# Patient Record
Sex: MALE | Race: AMERICAN INDIAN OR ALASKA NATIVE
[De-identification: names, ages, dates, MRNs, and addresses within clinical notes are randomized per-mention and may not be internally consistent; named-entity substitution may affect disease eponyms.]

---

## 2020-11-30 ENCOUNTER — HOSPITAL ENCOUNTER (INPATIENT)
Dept: HOSPITAL 5 - ED | Age: 55
LOS: 2 days | Discharge: HOME | DRG: 280 | End: 2020-12-02
Attending: INTERNAL MEDICINE | Admitting: INTERNAL MEDICINE
Payer: MEDICARE

## 2020-11-30 DIAGNOSIS — I13.2: Primary | ICD-10-CM

## 2020-11-30 DIAGNOSIS — E11.22: ICD-10-CM

## 2020-11-30 DIAGNOSIS — I50.31: ICD-10-CM

## 2020-11-30 DIAGNOSIS — I21.A1: ICD-10-CM

## 2020-11-30 DIAGNOSIS — Z79.891: ICD-10-CM

## 2020-11-30 DIAGNOSIS — N18.6: ICD-10-CM

## 2020-11-30 DIAGNOSIS — Z79.01: ICD-10-CM

## 2020-11-30 DIAGNOSIS — D63.8: ICD-10-CM

## 2020-11-30 DIAGNOSIS — Z99.2: ICD-10-CM

## 2020-11-30 DIAGNOSIS — Z79.84: ICD-10-CM

## 2020-11-30 DIAGNOSIS — E78.5: ICD-10-CM

## 2020-11-30 DIAGNOSIS — Z88.8: ICD-10-CM

## 2020-11-30 DIAGNOSIS — D69.59: ICD-10-CM

## 2020-11-30 DIAGNOSIS — I20.9: ICD-10-CM
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
## 2020-11-30 DIAGNOSIS — T45.525A: ICD-10-CM

## 2020-11-30 DIAGNOSIS — Z79.899: ICD-10-CM

## 2020-11-30 DIAGNOSIS — E87.5: ICD-10-CM

## 2020-11-30 DIAGNOSIS — Z79.82: ICD-10-CM

## 2020-11-30 LAB
ALBUMIN SERPL-MCNC: 4.6 G/DL (ref 3.9–5)
ALT SERPL-CCNC: 11 UNITS/L (ref 7–56)
BASOPHILS # (AUTO): 0.1 K/MM3 (ref 0–0.1)
BASOPHILS NFR BLD AUTO: 0.8 % (ref 0–1.8)
BILIRUB DIRECT SERPL-MCNC: < 0.2 MG/DL (ref 0–0.2)
BUN SERPL-MCNC: 54 MG/DL (ref 9–20)
BUN/CREAT SERPL: 4 %
CALCIUM SERPL-MCNC: 9.5 MG/DL (ref 8.4–10.2)
EOSINOPHIL # BLD AUTO: 0.1 K/MM3 (ref 0–0.4)
EOSINOPHIL NFR BLD AUTO: 0.8 % (ref 0–4.3)
HCT VFR BLD CALC: 44.5 % (ref 35.5–45.6)
HDLC SERPL-MCNC: 65 MG/DL (ref 40–59)
HEMOLYSIS INDEX: 7
HGB BLD-MCNC: 14.5 GM/DL (ref 11.8–15.2)
LYMPHOCYTES # BLD AUTO: 1.2 K/MM3 (ref 1.2–5.4)
LYMPHOCYTES NFR BLD AUTO: 11.9 % (ref 13.4–35)
MCHC RBC AUTO-ENTMCNC: 33 % (ref 32–34)
MCV RBC AUTO: 91 FL (ref 84–94)
MONOCYTES # (AUTO): 1 K/MM3 (ref 0–0.8)
MONOCYTES % (AUTO): 10.6 % (ref 0–7.3)
PLATELET # BLD: 116 K/MM3 (ref 140–440)
RBC # BLD AUTO: 4.9 M/MM3 (ref 3.65–5.03)

## 2020-11-30 PROCEDURE — 82962 GLUCOSE BLOOD TEST: CPT

## 2020-11-30 PROCEDURE — 80076 HEPATIC FUNCTION PANEL: CPT

## 2020-11-30 PROCEDURE — 93306 TTE W/DOPPLER COMPLETE: CPT

## 2020-11-30 PROCEDURE — A9540 TC99M MAA: HCPCS

## 2020-11-30 PROCEDURE — 93005 ELECTROCARDIOGRAM TRACING: CPT

## 2020-11-30 PROCEDURE — 80074 ACUTE HEPATITIS PANEL: CPT

## 2020-11-30 PROCEDURE — 36415 COLL VENOUS BLD VENIPUNCTURE: CPT

## 2020-11-30 PROCEDURE — 85025 COMPLETE CBC W/AUTO DIFF WBC: CPT

## 2020-11-30 PROCEDURE — 83735 ASSAY OF MAGNESIUM: CPT

## 2020-11-30 PROCEDURE — 80048 BASIC METABOLIC PNL TOTAL CA: CPT

## 2020-11-30 PROCEDURE — 93017 CV STRESS TEST TRACING ONLY: CPT

## 2020-11-30 PROCEDURE — 78452 HT MUSCLE IMAGE SPECT MULT: CPT

## 2020-11-30 PROCEDURE — 84484 ASSAY OF TROPONIN QUANT: CPT

## 2020-11-30 PROCEDURE — 84100 ASSAY OF PHOSPHORUS: CPT

## 2020-11-30 PROCEDURE — 80061 LIPID PANEL: CPT

## 2020-11-30 PROCEDURE — A9502 TC99M TETROFOSMIN: HCPCS

## 2020-11-30 PROCEDURE — 78580 LUNG PERFUSION IMAGING: CPT

## 2020-11-30 PROCEDURE — 71046 X-RAY EXAM CHEST 2 VIEWS: CPT

## 2020-11-30 RX ADMIN — Medication SCH ML: at 22:33

## 2020-11-30 RX ADMIN — HEPARIN SODIUM SCH UNIT: 5000 INJECTION, SOLUTION INTRAVENOUS; SUBCUTANEOUS at 22:33

## 2020-11-30 NOTE — EVENT NOTE
ED Screening Note


ED Screening Note: 








left sided cp and rib pain that began saturday after finishing dialysis 


states he has bouts of SOB when he gets pain 


no leg swelling 


has a permcath for dialysis 


no fever 


no cough 


no v/d


no sick contacts


no recent travel 


PMHx ESRD, DM, HTN, HLD, anemia 


allergy: lisinopril 





This initial assessment/diagnostic orders/clinical plan/treatment(s) is/are 

subject to change based on patients health status, clinical progression and re-

assessment by fellow clinical providers in the ED. Further treatment and workup 

at subsequent clinical providers discretion. Patient/guardian urged not to elope

from the ED as their condition may be serious if not clinically assessed and 

managed. 





Initial orders include: 


CP protocol

## 2020-11-30 NOTE — HISTORY AND PHYSICAL REPORT
History of Present Illness


Chief complaint: 





My chest hurts and it is getting worse


History of present illness: 


54 YO Male with ESRD on HD(T,R,Sa), HTN, DM, Anemia of Chronic Disease presents 

to ED for evaluation.  Patient states that he has experienced chest pain over 

the past 2 days with acutely worsening symptoms over the past 6 hours.  Patient 

states that symptoms were initially intermittent but have now become constant.  

Patient states that pain is 9/10, constant, substernal, nonradiating, sharp, 

squeezing in nature.  Patient denies decreased exercise tolerance, dyspnea on 

exertion, dyspnea at rest.  Patient transported to [facility] via private vehicle for 

further care and evaluation of the aforementioned symptoms.  Patient seen and 

evaluated in the emergency department.  All lab and imaging studies reviewed.  

Patient found to have angina as well as non-ST elevation MI, end-stage renal 

disease in need of dialysis, as well as clinical symptoms consistent with 

diastolic congestive heart failure.  Patient admitted to telemetry due to 

increased risk of cardiac decompensation.  Cardiology team consulted in ED.  

Nephrology team consulted in ED for urgent dialysis.  Patient denies fever, 

chills, palpitations, productive cough, skin rash, recent ill contacts, or known

exposure to COVID-19.  No prior admission for review.  No medication listed for 

reconciliation at the time of my admission.








Past History


Past Medical History: diabetes, ESRD, hypertension


Past Surgical History: Other (Dialysis access)





Medications and Allergies


                                    Allergies











Allergy/AdvReac Type Severity Reaction Status Date / Time


 


lisinopril AdvReac  Unknown Verified 11/30/20 09:03














Review of Systems


Constitutional: no weight loss, no weight gain, no fever, no chills


Ears, nose, mouth and throat: no ear pain, no ear discharge, no tinnitis, no 

decreased hearing, no nose pain, no nasal discharge


Cardiovascular: chest pain, shortness of breath, dyspnea on exertion, decreased 

exercise tolerance, no palpitations, no rapid/irregular heart beat


Respiratory: no cough, no excessive sputum, no hemoptysis, no shortness of 

breath


Gastrointestinal: no abdominal pain, no nausea, no vomiting, no diarrhea, no 

constipation


Genitourinary Male: no hematuria, no flank pain, no discharge, no urinary 

frequency, no urinary hesitancy


Rectal: no pain, no incontinence, no bleeding


Musculoskeletal: no neck stiffness, no neck pain, no shooting arm pain, no arm 

numbness/tingling, no low back pain, no leg numbness/tingling


Integumentary: no rash, no pruritis, no redness, no sores, no wounds


Neurological: no transient paralysis, no paralysis, no weakness, no parathesias,

no numbness, no tingling, no seizures


Psychiatric: no anxiety, no memory loss, no change in sleep habits, no sleep 

disturbances, no insomnia, no hypersomnia, no change in appetite, no change in 

libido


Endocrine: no cold intolerance, no heat intolerance, no polyphagia, no excessive

thirst, no nocturia


Hematologic/Lymphatic: no easy bruising, no easy bleeding, no lymphedema


Allergic/Immunologic: no urticaria, no allergic rhinitis, no wheezing, no 

anaphylaxis, no angioedema





Exam





- Constitutional


Vitals: 


                                        











Temp Pulse Resp BP Pulse Ox


 


 98.6 F   89   17   149/85   97 


 


 11/30/20 09:03  11/30/20 14:14  11/30/20 14:14  11/30/20 14:14  11/30/20 14:14











General appearance: Present: mild distress, cachectic





- EENT


Eyes: Present: PERRL


ENT: hearing intact, clear oral mucosa





- Neck


Neck: Present: supple, normal ROM





- Respiratory


Respiratory effort: normal


Respiratory: bilateral: CTA





- Cardiovascular


Heart Sounds: Present: S1 & S2.  Absent: rub, click





- Extremities


Extremities: pulses symmetrical, No edema


Peripheral Pulses: within normal limits





- Abdominal


General gastrointestinal: Present: soft, non-tender, non-distended, normal bowel

sounds


Male genitourinary: Present: normal





- Integumentary


Integumentary: Present: clear, warm, dry





- Musculoskeletal


Musculoskeletal: gait normal, strength equal bilaterally





- Psychiatric


Psychiatric: appropriate mood/affect, intact judgment & insight





- Neurologic


Neurologic: CNII-XII intact, moves all extremities





HEART Score





- HEART Score


Troponin: 


                                        











Troponin T  0.120 ng/mL (0.00-0.029)  H*  11/30/20  14:29    














Results





- Labs


CBC & Chem 7: 


                                 11/30/20 10:39





                                 11/30/20 10:39


Labs: 


                              Abnormal lab results











  11/30/20 11/30/20 11/30/20 Range/Units





  10:39 10:39 10:39 


 


Plt Count  116 L    (140-440)  K/mm3


 


Lymph % (Auto)  11.9 L    (13.4-35.0)  %


 


Mono % (Auto)  10.6 H    (0.0-7.3)  %


 


Mono # (Auto)  1.0 H    (0.0-0.8)  K/mm3


 


Seg Neutrophils %  75.9 H    (40.0-70.0)  %


 


Potassium   5.5 H   (3.6-5.0)  mmol/L


 


Chloride   96.6 L   ()  mmol/L


 


BUN   54 H   (9-20)  mg/dL


 


Creatinine   13.2 H   (0.8-1.3)  mg/dL


 


Phosphorus    5.30 H  (2.5-4.5)  mg/dL


 


Magnesium    2.50 H  (1.7-2.3)  mg/dL


 


Troponin T   0.119 H*   (0.00-0.029)  ng/mL


 


HDL Cholesterol   65 H   (40-59)  mg/dL














  11/30/20 Range/Units





  14:29 


 


Plt Count   (140-440)  K/mm3


 


Lymph % (Auto)   (13.4-35.0)  %


 


Mono % (Auto)   (0.0-7.3)  %


 


Mono # (Auto)   (0.0-0.8)  K/mm3


 


Seg Neutrophils %   (40.0-70.0)  %


 


Potassium   (3.6-5.0)  mmol/L


 


Chloride   ()  mmol/L


 


BUN   (9-20)  mg/dL


 


Creatinine   (0.8-1.3)  mg/dL


 


Phosphorus   (2.5-4.5)  mg/dL


 


Magnesium   (1.7-2.3)  mg/dL


 


Troponin T  0.120 H*  (0.00-0.029)  ng/mL


 


HDL Cholesterol   (40-59)  mg/dL














Assessment and Plan





- Patient Problems


(1) Angina at rest


Current Visit: Yes   Status: Acute   


Plan to address problem: 


Serial cardiac enzymes, EKG, telemetry, cardiology team consulted in ED, 

morphine, supplemental oxygen, nitro, aspirin,








(2) Non-ST elevation MI (NSTEMI)


Current Visit: Yes   Status: Acute   


Plan to address problem: 


Serial cardiac enzymes, EKG, telemetry monitoring, cardiology team consulted in 

ED, echocardiogram ordered and is pending at time of admission, stress test 

pending as per cardiology team.








(3) Diastolic CHF


Current Visit: Yes   Status: Acute   


Qualifiers: 


   Heart failure chronicity: acute   Qualified Code(s): I50.31 - Acute diastolic

(congestive) heart failure   


Plan to address problem: 


Strict I's/O, monitor urine output every shift, daily weight, afterload 

reduction, blood pressure control, echocardiogram ordered and is pending at time

of admission.








(4) DVT prophylaxis


Current Visit: Yes   Status: Acute   


Plan to address problem: 


SCD to bilateral lower extremities while in bed, prophylactic anticoagulation.

## 2020-11-30 NOTE — EMERGENCY DEPARTMENT REPORT
ED General Adult HPI





- General


Chief complaint: Chest Pain


Stated complaint: LF SIDE RIB PAIN


Time Seen by Provider: 11/30/20 11:54


Source: patient


Mode of arrival: Wheelchair


Limitations: No Limitations





- History of Present Illness


Initial comments: 





The patient presents to the emergency department the chief complaint of left-

sided chest pain that has been intermittent since Saturday.  Patient states the 

chest pain started after dialysis.  Patient states that he saw his cardiologist 

who is with [facility] cardiology and is scheduled for stress test next 

Monday.  Patient denies shortness of breath, abdominal pain, headache.


-: Sudden


Location: chest


Radiation: non-radiation


Severity scale (0 -10): 9


Quality: sharp


Consistency: constant


Improves with: none


Worsens with: none


Associated Symptoms: denies other symptoms


Treatments Prior to Arrival: none





- Related Data


                                    Allergies











Allergy/AdvReac Type Severity Reaction Status Date / Time


 


lisinopril AdvReac  Unknown Verified 11/30/20 09:03














ED Review of Systems


ROS: 


Stated complaint: LF SIDE RIB PAIN


Other details as noted in HPI





Comment: All other systems reviewed and negative


Constitutional: denies: chills, fever


Eyes: denies: eye pain, eye discharge, vision change


ENT: denies: ear pain, throat pain


Respiratory: denies: cough, shortness of breath, wheezing


Cardiovascular: chest pain.  denies: palpitations


Endocrine: no symptoms reported


Gastrointestinal: denies: abdominal pain, nausea, diarrhea


Genitourinary: denies: urgency, dysuria


Musculoskeletal: denies: back pain, joint swelling, arthralgia


Skin: denies: rash, lesions


Neurological: denies: headache, weakness, paresthesias


Psychiatric: denies: anxiety, depression


Hematological/Lymphatic: denies: easy bleeding, easy bruising





ED Past Medical Hx





- Past Medical History


Previous Medical History?: Yes


Hx Hypertension: Yes


Hx Diabetes: Yes


Hx Renal Disease: Yes (Dialysis Tue, Thur, Saturday)


Additional medical history: Anemia





- Social History


Smoking Status: Never Smoker


Substance Use Type: None





ED Physical Exam





- General


Limitations: No Limitations


General appearance: alert, in no apparent distress





- Head


Head exam: Present: atraumatic, normocephalic





- Eye


Eye exam: Present: normal appearance, PERRL





- ENT


ENT exam: Present: mucous membranes moist





- Neck


Neck exam: Present: normal inspection





- Respiratory


Respiratory exam: Present: normal lung sounds bilaterally.  Absent: respiratory 

distress





- Cardiovascular


Cardiovascular Exam: Present: regular rate, normal rhythm.  Absent: systolic 

murmur, diastolic murmur, rubs, gallop





- GI/Abdominal


GI/Abdominal exam: Present: soft, normal bowel sounds.  Absent: distended, 

tenderness





- Rectal


Rectal exam: Present: deferred





- Extremities Exam


Extremities exam: Present: normal inspection





- Back Exam


Back exam: Present: normal inspection





- Neurological Exam


Neurological exam: Present: alert, oriented X3, CN II-XII intact.  Absent: motor

sensory deficit





- Psychiatric


Psychiatric exam: Present: normal affect, normal mood





- Skin


Skin exam: Present: warm, dry, intact, normal color.  Absent: rash





ED Course


                                   Vital Signs











  11/30/20 11/30/20





  09:03 14:14


 


Temperature 98.6 F 


 


Pulse Rate 92 H 89


 


Respiratory 18 17





Rate  


 


Blood Pressure 134/77 149/85





[Right]  


 


O2 Sat by Pulse 96 97





Oximetry  














ED Medical Decision Making





- Lab Data


Result diagrams: 


                                 11/30/20 10:39





                                 11/30/20 10:39








                                   Lab Results











  11/30/20 11/30/20 11/30/20 Range/Units





  10:39 10:39 10:39 


 


WBC  9.6    (4.5-11.0)  K/mm3


 


RBC  4.90    (3.65-5.03)  M/mm3


 


Hgb  14.5    (11.8-15.2)  gm/dl


 


Hct  44.5    (35.5-45.6)  %


 


MCV  91    (84-94)  fl


 


MCH  30    (28-32)  pg


 


MCHC  33    (32-34)  %


 


RDW  14.4    (13.2-15.2)  %


 


Plt Count  116 L    (140-440)  K/mm3


 


Lymph % (Auto)  11.9 L    (13.4-35.0)  %


 


Mono % (Auto)  10.6 H    (0.0-7.3)  %


 


Eos % (Auto)  0.8    (0.0-4.3)  %


 


Baso % (Auto)  0.8    (0.0-1.8)  %


 


Lymph # (Auto)  1.2    (1.2-5.4)  K/mm3


 


Mono # (Auto)  1.0 H    (0.0-0.8)  K/mm3


 


Eos # (Auto)  0.1    (0.0-0.4)  K/mm3


 


Baso # (Auto)  0.1    (0.0-0.1)  K/mm3


 


Seg Neutrophils %  75.9 H    (40.0-70.0)  %


 


Seg Neutrophils #  7.3    (1.8-7.7)  K/mm3


 


Sodium   139   (137-145)  mmol/L


 


Potassium   5.5 H   (3.6-5.0)  mmol/L


 


Chloride   96.6 L   ()  mmol/L


 


Carbon Dioxide   25   (22-30)  mmol/L


 


Anion Gap   23   mmol/L


 


BUN   54 H   (9-20)  mg/dL


 


Creatinine   13.2 H   (0.8-1.3)  mg/dL


 


Estimated GFR   4   ml/min


 


BUN/Creatinine Ratio   4   %


 


Glucose   78   ()  mg/dL


 


Calcium   9.5   (8.4-10.2)  mg/dL


 


Phosphorus    5.30 H  (2.5-4.5)  mg/dL


 


Magnesium    2.50 H  (1.7-2.3)  mg/dL


 


Total Bilirubin    0.60  (0.1-1.2)  mg/dL


 


Direct Bilirubin    < 0.2  (0-0.2)  mg/dL


 


AST    11  (5-40)  units/L


 


ALT    11  (7-56)  units/L


 


Alkaline Phosphatase    83  ()  units/L


 


Troponin T   0.119 H*   (0.00-0.029)  ng/mL


 


Total Protein    7.8  (6.3-8.2)  g/dL


 


Albumin    4.6  (3.9-5)  g/dL


 


Albumin/Globulin Ratio    1.4  %


 


Triglycerides   68   (2-149)  mg/dL


 


Cholesterol   176   ()  mg/dL


 


LDL Cholesterol Direct   103   ()  mg/dL


 


HDL Cholesterol   65 H   (40-59)  mg/dL


 


Cholesterol/HDL Ratio   2.70   %














  11/30/20 Range/Units





  14:29 


 


WBC   (4.5-11.0)  K/mm3


 


RBC   (3.65-5.03)  M/mm3


 


Hgb   (11.8-15.2)  gm/dl


 


Hct   (35.5-45.6)  %


 


MCV   (84-94)  fl


 


MCH   (28-32)  pg


 


MCHC   (32-34)  %


 


RDW   (13.2-15.2)  %


 


Plt Count   (140-440)  K/mm3


 


Lymph % (Auto)   (13.4-35.0)  %


 


Mono % (Auto)   (0.0-7.3)  %


 


Eos % (Auto)   (0.0-4.3)  %


 


Baso % (Auto)   (0.0-1.8)  %


 


Lymph # (Auto)   (1.2-5.4)  K/mm3


 


Mono # (Auto)   (0.0-0.8)  K/mm3


 


Eos # (Auto)   (0.0-0.4)  K/mm3


 


Baso # (Auto)   (0.0-0.1)  K/mm3


 


Seg Neutrophils %   (40.0-70.0)  %


 


Seg Neutrophils #   (1.8-7.7)  K/mm3


 


Sodium   (137-145)  mmol/L


 


Potassium   (3.6-5.0)  mmol/L


 


Chloride   ()  mmol/L


 


Carbon Dioxide   (22-30)  mmol/L


 


Anion Gap   mmol/L


 


BUN   (9-20)  mg/dL


 


Creatinine   (0.8-1.3)  mg/dL


 


Estimated GFR   ml/min


 


BUN/Creatinine Ratio   %


 


Glucose   ()  mg/dL


 


Calcium   (8.4-10.2)  mg/dL


 


Phosphorus   (2.5-4.5)  mg/dL


 


Magnesium   (1.7-2.3)  mg/dL


 


Total Bilirubin   (0.1-1.2)  mg/dL


 


Direct Bilirubin   (0-0.2)  mg/dL


 


AST   (5-40)  units/L


 


ALT   (7-56)  units/L


 


Alkaline Phosphatase   ()  units/L


 


Troponin T  0.120 H*  (0.00-0.029)  ng/mL


 


Total Protein   (6.3-8.2)  g/dL


 


Albumin   (3.9-5)  g/dL


 


Albumin/Globulin Ratio   %


 


Triglycerides   (2-149)  mg/dL


 


Cholesterol   ()  mg/dL


 


LDL Cholesterol Direct   ()  mg/dL


 


HDL Cholesterol   (40-59)  mg/dL


 


Cholesterol/HDL Ratio   %














- EKG Data


-: EKG Interpreted by Me


EKG shows normal: sinus rhythm


Rate: normal





- Radiology Data


Radiology results: report reviewed


Critical care attestation.: 


If time is entered above; I have spent that time in minutes in the direct care 

of this critically ill patient, excluding procedure time.








ED Disposition


Clinical Impression: 


 Chest pain





Disposition: DC-09 OP ADMIT IP TO THIS HOSP


Is pt being admited?: Yes


Does the pt Need Aspirin: Yes


Condition: Fair


Instructions:  Chest Pain (ED)


Referrals: 


TESFAYE CASTILLO MD [Primary Care Provider] - 3-5 Days

## 2020-11-30 NOTE — NUCLEAR MEDICINE REPORT
NUCLEAR MEDICINE PERFUSION LUNG SCAN



INDICATION / CLINICAL INFORMATION:

SOB.



TECHNIQUE:

5.2 mCi of Tc-99m MAA were given by IV.



COMPARISON:

Chest radiographs dated 11/30/2020.



FINDINGS:



PERFUSION: No significant perfusion defects.



ADDITIONAL FINDINGS: None.



IMPRESSION:

1. Low probability for pulmonary embolism.



Signer Name: Rajan Soni MD 

Signed: 11/30/2020 1:54 PM

Workstation Name: [email or web address]

## 2020-11-30 NOTE — XRAY REPORT
CHEST 2 VIEWS 



INDICATION / CLINICAL INFORMATION:

Chest Pain.



COMPARISON: 

None.



FINDINGS:



SUPPORT DEVICES: Bilateral central venous catheters present with both tips projecting over the superi
or cavoatrial junction.



HEART / MEDIASTINUM: No significant abnormality. 



LUNGS / PLEURA: Mild pulmonary vascular congestion. No pneumothorax. 



ADDITIONAL FINDINGS: No significant additional findings.



IMPRESSION:

1. Mild pulmonary vascular congestion without focal pulmonary abnormality.



Signer Name: Sotero Lawrence MD 

Signed: 11/30/2020 10:29 AM

Workstation Name: [id number]

## 2020-12-01 LAB
BASOPHILS # (AUTO): 0 K/MM3 (ref 0–0.1)
BASOPHILS NFR BLD AUTO: 0.4 % (ref 0–1.8)
BUN SERPL-MCNC: 67 MG/DL (ref 9–20)
BUN/CREAT SERPL: 5 %
CALCIUM SERPL-MCNC: 9.1 MG/DL (ref 8.4–10.2)
EOSINOPHIL # BLD AUTO: 0.2 K/MM3 (ref 0–0.4)
EOSINOPHIL NFR BLD AUTO: 2.5 % (ref 0–4.3)
HCT VFR BLD CALC: 41.9 % (ref 35.5–45.6)
HEMOLYSIS INDEX: 10
HGB BLD-MCNC: 13.7 GM/DL (ref 11.8–15.2)
LYMPHOCYTES # BLD AUTO: 1.4 K/MM3 (ref 1.2–5.4)
LYMPHOCYTES NFR BLD AUTO: 17.2 % (ref 13.4–35)
MCHC RBC AUTO-ENTMCNC: 33 % (ref 32–34)
MCV RBC AUTO: 91 FL (ref 84–94)
MONOCYTES # (AUTO): 0.9 K/MM3 (ref 0–0.8)
MONOCYTES % (AUTO): 12 % (ref 0–7.3)
PLATELET # BLD: 120 K/MM3 (ref 140–440)
RBC # BLD AUTO: 4.6 M/MM3 (ref 3.65–5.03)

## 2020-12-01 PROCEDURE — 5A1D70Z PERFORMANCE OF URINARY FILTRATION, INTERMITTENT, LESS THAN 6 HOURS PER DAY: ICD-10-PCS | Performed by: INTERNAL MEDICINE

## 2020-12-01 RX ADMIN — Medication SCH ML: at 09:44

## 2020-12-01 RX ADMIN — Medication SCH ML: at 22:52

## 2020-12-01 RX ADMIN — HEPARIN SODIUM SCH UNIT: 5000 INJECTION, SOLUTION INTRAVENOUS; SUBCUTANEOUS at 09:45

## 2020-12-01 RX ADMIN — HEPARIN SODIUM SCH UNIT: 5000 INJECTION, SOLUTION INTRAVENOUS; SUBCUTANEOUS at 22:52

## 2020-12-01 RX ADMIN — METOPROLOL TARTRATE SCH MG: 25 TABLET, FILM COATED ORAL at 22:52

## 2020-12-01 NOTE — CONSULTATION
History of Present Illness





- Reason for Consult


Consult date: 12/01/20


end stage renal disease


Requesting physician: HAYDEN NEAL





- History of Present Illness


54 YO Male with ESRD on HD(T,R,Sa), HTN, DM, Anemia of Chronic Disease presents 

to ED for evaluation.  Patient states that he has experienced chest pain over 

the past 2 days with acutely worsening symptoms over the past 6 hours.  Patient 

states that symptoms were initially intermittent but have now become constant.  

Patient states that pain is 9/10, constant, substernal, nonradiating, sharp, 

squeezing in nature.  Patient denies decreased exercise tolerance, dyspnea on 

exertion, dyspnea at rest.  Patient transported to [facility] via private vehicle for 

further care and evaluation of the aforementioned symptoms.  Patient seen and 

evaluated in the emergency department.  All lab and imaging studies reviewed.  

Patient found to have angina as well as non-ST elevation MI, end-stage renal 

disease in need of dialysis, as well as clinical symptoms consistent with 

diastolic congestive heart failure.  Patient admitted to telemetry due to 

increased risk of cardiac decompensation.  Cardiology team consulted in ED. 


Patient undergoes dialysis at [facility] on TTS schedule under our

care.  Patient did have his dialysis on Saturday and is due for dialysis again 

today.  He denies any shortness of breath at this time.








Past History


Past Medical History: diabetes, dialysis, ESRD, hypertension


Past Surgical History: Other (Dialysis access)





Medications and Allergies


                                    Allergies











Allergy/AdvReac Type Severity Reaction Status Date / Time


 


lisinopril AdvReac  Unknown Verified 11/30/20 09:03











                                Home Medications











 Medication  Instructions  Recorded  Confirmed  Last Taken  Type


 


Aspirin BABY CHEW  mg PO ONCE 12/01/20 12/01/20 Unknown History


 


AtorvaSTATin 40 mg PO ONCE 12/01/20 12/01/20 Unknown History


 


NovoLOG Mix 70/30 VIAL 3 units IJ ONCE 12/01/20 12/01/20 Unknown History


 


Velphoro (Nf) 500 mg PO TID 12/01/20 12/01/20 Unknown History











Active Meds: 


Active Medications





Acetaminophen (Tylenol)  650 mg PO Q4H PRN


   PRN Reason: Pain MILD(1-3)/Fever >100.5/HA


Albuterol (Proventil)  2.5 mg IH Q4HRT PRN


   PRN Reason: Shortness Of Breath


Aspirin (Aspirin)  325 mg PO QDAY ROSHAN


Atorvastatin Calcium (Lipitor)  40 mg PO QHS ROSHAN


Heparin Sodium (Porcine) (Heparin)  5,000 unit SUB-Q Q12HR [facility]


   Last Admin: 12/01/20 09:45 Dose:  5,000 unit


   Documented by: 


Sodium Chloride (Nacl 0.9%)  100 mls @ 999 mls/hr IV MIGUEL PRN


   PRN Reason: Hypotension


Metoprolol Tartrate (Metoprolol)  25 mg PO BID [facility]


Ondansetron HCl (Zofran)  4 mg IV Q8H PRN


   PRN Reason: Nausea And Vomiting


Sodium Chloride (Sodium Chloride Flush Syringe 10 Ml)  10 ml IV BID [facility]


   Last Admin: 12/01/20 09:44 Dose:  10 ml


   Documented by: 


Sodium Chloride (Sodium Chloride Flush Syringe 10 Ml)  10 ml IV PRN PRN


   PRN Reason: LINE FLUSH











Review of Systems


All systems: negative (Negative except as noted above)





Exam





- Vital Signs


Vital signs: 


                                   Vital Signs











Temp Pulse Resp BP Pulse Ox


 


 98.6 F   92 H  18   134/77   96 


 


 11/30/20 09:03  11/30/20 09:03  11/30/20 09:03  11/30/20 09:03  11/30/20 09:03














- General Appearance


General appearance: well-developed, well-nourished, appears stated age


EENT: PERRL, mucous membranes moist


Neck: Present: neck supple, trachea midline, Other (Right IJ PermCath in place).

 Absent: JVD/HJR, Masses


Respiratory: Clear to Ascultation


Heart: regular, normal heart rate, S1S2, no murmurs


Gastrointestinal: Present: normal, normoactive bowel sounds


Integumentary: no rash, other (No edema.  Multiple old AV access noted in his 

left upper arm.  No bruit or thrill)





Results





- Lab Results





                                 12/01/20 07:23





                                 12/01/20 07:23


                             Most recent lab results











Calcium  9.1 mg/dL (8.4-10.2)   12/01/20  07:23    


 


Phosphorus  5.30 mg/dL (2.5-4.5)  H  11/30/20  10:39    


 


Magnesium  2.50 mg/dL (1.7-2.3)  H  11/30/20  10:39    














Assessment and Plan


Impression


* End-stage renal disease on maintenance hemodialysis


* Hyperkalemia


* Chest pain


* Hypertension


* Diabetes


Recommendations


* Shall schedule patient for hemodialysis for today and keep him on TTS schedule

  as outpatient


* [facility]


* Hyperkalemia should be corrected with dialysis


* No indication for Epogen at this time


* Adjust diet and meds for ESRD state


* Avoid nephrotoxins


* Binders with meals


* Further plan as per cardiology services


* Thank you very much for the consultation.  Shall follow along with you

## 2020-12-01 NOTE — CONSULTATION
History of Present Illness


Consult date: 12/01/20


Requesting physician: NATHANAEL MORGAN


Consult reason: chest pain


History of present illness: 


The pt is a 56 YO male with a past medical history of ESRD on HD, HTN, DM. He is

previously unknown to our practice. He presented with c/o chest pain since 

Saturday. He describes his chest pain as an intermittent, left-sided ribcage 

aching pain which is aggravated by deep inspiration and positional changes 

(hurts to lie on his left side). He denies SOB, palpitations, n/v, diaphoresis, 

dizziness or syncope. He denies any known prior cardiac issues. He reports 

undergoing treadmill stress testing in the past which was normal to his 

knowledge. 








Past History


Past Medical History: diabetes, dialysis, ESRD, hypertension


Past Surgical History: Other (Dialysis access)





Medications and Allergies


                                    Allergies











Allergy/AdvReac Type Severity Reaction Status Date / Time


 


lisinopril AdvReac  Unknown Verified 11/30/20 09:03











                                Home Medications











 Medication  Instructions  Recorded  Confirmed  Last Taken  Type


 


Aspirin BABY CHEW  mg PO ONCE 12/01/20 12/01/20 Unknown History


 


AtorvaSTATin 40 mg PO ONCE 12/01/20 12/01/20 Unknown History


 


NovoLOG Mix 70/30 VIAL 3 units IJ ONCE 12/01/20 12/01/20 Unknown History


 


Velphoro (Nf) 500 mg PO TID 12/01/20 12/01/20 Unknown History











Active Meds: 


Active Medications





Acetaminophen (Tylenol)  650 mg PO Q4H PRN


   PRN Reason: Pain MILD(1-3)/Fever >100.5/HA


Albuterol (Proventil)  2.5 mg IH Q4HRT PRN


   PRN Reason: Shortness Of Breath


Heparin Sodium (Porcine) (Heparin)  5,000 unit SUB-Q Q12HR [facility]


   Last Admin: 12/01/20 09:45 Dose:  5,000 unit


   Documented by: 


Ondansetron HCl (Zofran)  4 mg IV Q8H PRN


   PRN Reason: Nausea And Vomiting


Sodium Chloride (Sodium Chloride Flush Syringe 10 Ml)  10 ml IV BID [facility]


   Last Admin: 12/01/20 09:44 Dose:  10 ml


   Documented by: 


Sodium Chloride (Sodium Chloride Flush Syringe 10 Ml)  10 ml IV PRN PRN


   PRN Reason: LINE FLUSH











Review of Systems


Constitutional: no weight loss, no weight gain, no fever, no chills, no sweats


Ears, nose, mouth and throat: no ear pain, no nose pain, no sinus pressure, no 

sinus pain


Cardiovascular: chest pain, no orthopnea, no palpitations, no rapid/irregular 

heart beat, no edema, no syncope, no lightheadedness, no shortness of breath, no

dyspnea on exertion, no leg edema


Respiratory: pain on inspiration, no cough, no shortness of breath, no dyspnea 

on exertion, no congestion, no wheezing


Gastrointestinal: no abdominal pain, no nausea, no vomiting, no diarrhea, no 

constipation, no change in bowel habits


Genitourinary Male: no dysuria, no hematuria, no flank pain, no discharge, no u

rinary frequency, no urinary hesitancy


Musculoskeletal: no neck stiffness, no neck pain, no shooting arm pain, no arm 

numbness/tingling, no low back pain, no shooting leg pain


Integumentary: no rash, no pruritis, no redness, no sores, no wounds


Neurological: no head injury, no paralysis, no weakness, no parathesias, no numb

ness, no tingling, no seizures, no syncope


Psychiatric: no anxiety


Endocrine: no cold intolerance, no heat intolerance


Hematologic/Lymphatic: no easy bruising, no easy bleeding


Allergic/Immunologic: no urticaria





Physical Examination


                                   Vital Signs











Temp Pulse Resp BP Pulse Ox


 


 98.6 F   92 H  18   134/77   96 


 


 11/30/20 09:03  11/30/20 09:03  11/30/20 09:03  11/30/20 09:03  11/30/20 09:03











General appearance: no acute distress


HEENT: Positive: PERRL, Normocephaly, Mucus Membranes Moist


Neck: Positive: neck supple, trachea midline


Cardiac: Positive: Reg Rate and Rhythm, S1/S2


Lungs: Positive: Decreased Breath Sounds


Abdomen: Negative: Tender


Skin: Negative: Rash


Musculoskeletal: No Pain


Extremities: Absent: edema





Results





                                 12/01/20 07:23





                                 11/30/20 10:39


                                 Cardiac Enzymes











  11/30/20 Range/Units





  10:39 


 


AST  11  (5-40)  units/L








                                     Lipids











  11/30/20 Range/Units





  10:39 


 


Triglycerides  68  (2-149)  mg/dL


 


Cholesterol  176  ()  mg/dL


 


HDL Cholesterol  65 H  (40-59)  mg/dL


 


Cholesterol/HDL Ratio  2.70  %








                                       CBC











  11/30/20 12/01/20 Range/Units





  10:39 07:23 


 


WBC  9.6  7.9  (4.5-11.0)  K/mm3


 


RBC  4.90  4.60  (3.65-5.03)  M/mm3


 


Hgb  14.5  13.7  (11.8-15.2)  gm/dl


 


Hct  44.5  41.9  (35.5-45.6)  %


 


Plt Count  116 L  120 L  (140-440)  K/mm3


 


Lymph # (Auto)  1.2  1.4  (1.2-5.4)  K/mm3


 


Mono # (Auto)  1.0 H  0.9 H  (0.0-0.8)  K/mm3


 


Eos # (Auto)  0.1  0.2  (0.0-0.4)  K/mm3


 


Baso # (Auto)  0.1  0.0  (0.0-0.1)  K/mm3








                          Comprehensive Metabolic Panel











  11/30/20 11/30/20 Range/Units





  10:39 10:39 


 


Sodium  139   (137-145)  mmol/L


 


Potassium  5.5 H   (3.6-5.0)  mmol/L


 


Chloride  96.6 L   ()  mmol/L


 


Carbon Dioxide  25   (22-30)  mmol/L


 


BUN  54 H   (9-20)  mg/dL


 


Creatinine  13.2 H   (0.8-1.3)  mg/dL


 


Glucose  78   ()  mg/dL


 


Calcium  9.5   (8.4-10.2)  mg/dL


 


Direct Bilirubin   < 0.2  (0-0.2)  mg/dL


 


AST   11  (5-40)  units/L


 


ALT   11  (7-56)  units/L


 


Alkaline Phosphatase   83  ()  units/L


 


Total Protein   7.8  (6.3-8.2)  g/dL


 


Albumin   4.6  (3.9-5)  g/dL














- Imaging and Cardiology


Echo: pending


EKG: report reviewed, image reviewed





EKG interpretations





- Telemetry


EKG Rhythm: Sinus Rhythm





- EKG


Sinus rhythms and dysrhythmias: sinus rhythm





Assessment and Plan


Chest pain appears musculoskeletal in origin, currently resolved. EKG with NSR, 

no acute ischemic changes. V/Q low prob for PE. Cont present cardiac regimen. 

Obtain echo and plan for lexiscan MPI stress test in AM. NPO after MN. 





The patient has been seen in conjunction with Dr. Elam who agrees with the 

assessment and plan of care. 








- Patient Problems


(1) Chest pain


Current Visit: Yes   Status: Acute   





(2) Non-ST elevation MI (NSTEMI)


Current Visit: Yes   Status: Acute   


Plan to address problem: 


suspect type II








(3) HTN (hypertension)


Current Visit: Yes   Status: Chronic   





(4) Diabetes


Current Visit: Yes   Status: Chronic   





(5) ESRD on hemodialysis


Current Visit: Yes   Status: Chronic

## 2020-12-01 NOTE — PROGRESS NOTE
Assessment and Plan





-- Angina at rest


Monitor weight serial cardiac enzymes, EKG, telemetry, 


cardiology team consulted in ED, 


Placed on morphine, supplemental oxygen, nitro, aspirin,


VQ scan negative for any PE


Plan for stress test tomorrow








-- Non-ST elevation MI (NSTEMI)


Likely type II due to end-stage renal disease


Cardiology consulted, continue to monitor clinically for now


Stress test ordered for tomorrow





--Hyperkalemia, due to end-stage renal disease


Should correct with hemodialysis, repeat BMP in the morning





-- HTN.


Resume home antihypertensive, continue to adjust medications as needed








-- diabetes mellitus type 2


Consistent carb diet, SSI for blood glucose management





ESRD


-Nephrology consulted for hemodialysis





Brief history:


The pt is a 54 YO male with a past medical history of ESRD on HD, HTN, DM 

presented with c/o left-sided chest pain since Saturday. He describes his chest 

pain as an intermittent, left-sided ribcage aching pain which is aggravated by 

deep inspiration and positional changes (hurts to lie on his left side).  Checks

x-ray showed mild pulmonary vascular congestion, VQ scan showed low probability 

for PE.  Cardiac enzymes showed elevated troponin, patient placed on aspirin and

statin, cardiology consulted in the ER, admitted for further evaluation and 

management.





12/01: Plan for stress test tomorrow, nephrology consulted for hemodialysis.  

Continue to monitor clinically








Subjective


Date of service: 12/01/20


Interval history: 





Patient seen and examined.  Medical records and medication list reviewed.  


No acute event overnight noted by the RN.  


Patient denies any chest pain now, has no difficulty breathing.  


Patient is tolerating diet.  


Discussed plan of care at bedside with patient.


Stress test canceled for today as patient had a VQ scan -plan for tomorrow 

morning





Objective





- Exam


Narrative Exam: 





GENERAL:  well-developed and well-nourished -American male lying on bed 

appeared to be in no discomfort. 


HEENT: Normocephalic.  Atraumatic.  No conjunctival congestion or icterus. 

Patient has moist mucous membranes.


NECK: Supple.  Trachea midline.


CHEST/LUNGS: Clear to auscultated bilaterally, breathing nonlabored. No wheezes 

crackles or rhonchi.


HEART/CARDIOVASCULAR: Regular in rate and rhythm.  S1 and S2 positive.


ABDOMEN: Abdomen is soft, nontender.  Patient has normal bowel sounds.  


SKIN: There is no rash.  Warm and dry.


NEURO:  No focal motor deficit.  Follows command.


MUSCULOSKELETAL: No joint effusion or tenderness.


EXTRIMITY: No edema, no cyanosis or clubbing.


PSYCH:  Cooperative.





- Constitutional


Vitals: 


                               Vital Signs - 12hr











  12/01/20 12/01/20 12/01/20





  00:46 03:42 07:35


 


Temperature  98.0 F 97.8 F


 


Pulse Rate 79 77 74


 


Pulse Rate [   





Left Radial]   


 


Pulse Rate [   





Right Radial]   


 


Respiratory  18 18





Rate   


 


Respiratory   





Rate [lLUQ]   


 


Blood Pressure  126/77 142/84


 


O2 Sat by Pulse  94 96





Oximetry   














  12/01/20 12/01/20





  10:00 11:57


 


Temperature  97.8 F


 


Pulse Rate  79


 


Pulse Rate [ 74 





Left Radial]  


 


Pulse Rate [ 74 





Right Radial]  


 


Respiratory 19 18





Rate  


 


Respiratory 18 





Rate [lLUQ]  


 


Blood Pressure  134/81


 


O2 Sat by Pulse  96





Oximetry  














- Labs


CBC & Chem 7: 


                                 12/01/20 07:23





                                 12/02/20 09:38


Labs: 


                              Abnormal lab results











  11/30/20 11/30/20 11/30/20 Range/Units





  10:39 14:29 Unknown 


 


Plt Count     (140-440)  K/mm3


 


Mono % (Auto)     (0.0-7.3)  %


 


Mono # (Auto)     (0.0-0.8)  K/mm3


 


Sodium     (137-145)  mmol/L


 


Potassium     (3.6-5.0)  mmol/L


 


Chloride     ()  mmol/L


 


BUN     (9-20)  mg/dL


 


Creatinine     (0.8-1.3)  mg/dL


 


POC Glucose     ()  mg/dL


 


Phosphorus  5.30 H    (2.5-4.5)  mg/dL


 


Magnesium  2.50 H    (1.7-2.3)  mg/dL


 


Troponin T   0.120 H*  0.112 H*  (0.00-0.029)  ng/mL














  12/01/20 12/01/20 12/01/20 Range/Units





  07:23 07:23 11:24 


 


Plt Count  120 L    (140-440)  K/mm3


 


Mono % (Auto)  12.0 H    (0.0-7.3)  %


 


Mono # (Auto)  0.9 H    (0.0-0.8)  K/mm3


 


Sodium   135 L   (137-145)  mmol/L


 


Potassium   5.5 H   (3.6-5.0)  mmol/L


 


Chloride   96.1 L   ()  mmol/L


 


BUN   67 H   (9-20)  mg/dL


 


Creatinine   14.5 H   (0.8-1.3)  mg/dL


 


POC Glucose    152 H  ()  mg/dL


 


Phosphorus     (2.5-4.5)  mg/dL


 


Magnesium     (1.7-2.3)  mg/dL


 


Troponin T     (0.00-0.029)  ng/mL














HEART Score





- HEART Score


Troponin: 


                                        











Troponin T  0.112 ng/mL (0.00-0.029)  H*  11/30/20  Unknown

## 2020-12-02 VITALS — SYSTOLIC BLOOD PRESSURE: 89 MMHG | DIASTOLIC BLOOD PRESSURE: 59 MMHG

## 2020-12-02 LAB
BUN SERPL-MCNC: 35 MG/DL (ref 9–20)
BUN/CREAT SERPL: 4 %
CALCIUM SERPL-MCNC: 9.2 MG/DL (ref 8.4–10.2)
HEMOLYSIS INDEX: 16

## 2020-12-02 RX ADMIN — INSULIN HUMAN SCH: 100 INJECTION, SOLUTION PARENTERAL at 08:30

## 2020-12-02 RX ADMIN — Medication SCH: at 13:00

## 2020-12-02 RX ADMIN — INSULIN HUMAN SCH UNIT: 100 INJECTION, SOLUTION PARENTERAL at 13:29

## 2020-12-02 RX ADMIN — INSULIN HUMAN SCH: 100 INJECTION, SOLUTION PARENTERAL at 00:55

## 2020-12-02 RX ADMIN — METOPROLOL TARTRATE SCH: 25 TABLET, FILM COATED ORAL at 13:00

## 2020-12-02 RX ADMIN — HEPARIN SODIUM SCH UNIT: 5000 INJECTION, SOLUTION INTRAVENOUS; SUBCUTANEOUS at 10:53

## 2020-12-02 NOTE — PROGRESS NOTE
Assessment and Plan


tte reviewed - EF 50-55%, catheter in RA. S/p lexiscan MPI stress test today 

which showed small mild inferolateral ischemia, normal EF. Given ECG with NAF, 

relatively flat Warner, atypical nature of chest pain and current resolution of 

chest pain, recommend medical management at this time. Cont , lipitor, 

lopressor. Initiate Imdur. No Plavix due to reported history of thrombocytopenia

secondary to Plavix. Can consider coronary angiography as OP if chest pain 

recurs despite optimal medical management.


Currently stable cardiac status. Pt may discharge from cardiology standpoint. 


Follow up in our [place] office with Dr. Elam on 12/14/2020 @ 11:15AM. 





The patient has been seen in conjunction with Dr. Elam who agrees with the 

assessment and plan of care. 








- Patient Problems


(1) Chest pain


Current Visit: Yes   Status: Resolved   





(2) Non-ST elevation MI (NSTEMI)


Current Visit: Yes   Status: Acute   


Plan to address problem: 


suspect type II








(3) HTN (hypertension)


Current Visit: Yes   Status: Chronic   





(4) Diabetes


Current Visit: Yes   Status: Chronic   





(5) ESRD on hemodialysis


Current Visit: Yes   Status: Chronic   





Subjective


Date of service: 12/02/20


Principal diagnosis: cp


Interval history: 


pt for stress test, no current complaints. tele reviewed - in SR.








Objective





                                Last Vital Signs











Temp  98.3 F   12/02/20 07:33


 


Pulse  79   12/02/20 07:33


 


Resp  20   12/02/20 07:33


 


BP  121/71   12/02/20 08:53


 


Pulse Ox  92   12/02/20 07:33

















- Physical Examination


General: No Apparent Distress


HEENT: Positive: PERRL, Normocephaly, Mucus Membranes Moist


Neck: Positive: neck supple, trachea midline, Other (Right IJ PermCath in 

place).  Negative: JVD/HJR, Masses


Cardiac: Positive: Reg Rate and Rhythm, S1/S2


Lungs: Positive: Decreased Breath Sounds


Abdomen: Negative: Tender


Skin: Negative: Rash


Musculoskeletal: No Pain


Extremities: Absent: edema





- Labs and Meds


                          Comprehensive Metabolic Panel











  12/02/20 Range/Units





  09:38 


 


Sodium  136 L  (137-145)  mmol/L


 


Potassium  4.5  (3.6-5.0)  mmol/L


 


Chloride  95.9 L  ()  mmol/L


 


Carbon Dioxide  29  (22-30)  mmol/L


 


BUN  35 H  (9-20)  mg/dL


 


Creatinine  10.0 H  (0.8-1.3)  mg/dL


 


Glucose  151 H  ()  mg/dL


 


Calcium  9.2  (8.4-10.2)  mg/dL














- Imaging and Cardiology


EKG: report reviewed, image reviewed


Echo: pending





- EKG


Sinus rhythms and dysrhythmias: sinus rhythm

## 2020-12-02 NOTE — PROGRESS NOTE
Assessment and Plan


Impression


* End-stage renal disease on maintenance hemodialysis


* Hyperkalemia


* Chest pain


* Hypertension


* Diabetes


Recommendations


* Patient had uneventful hemodialysis yesterday 


* Continue dialysis on TTS schedule.  


* Patient undergoes dialysis at [facility] on TTS schedule


* Hyperkalemia should be corrected with dialysis


* No indication for Epogen at this time


* Adjust diet and meds for ESRD state


* Avoid nephrotoxins


* Binders with meals


* Further plan as per cardiology services











Subjective


Date of service: 12/02/20


Interval history: 


Patient is currently undergoing a stress test.  Patient denies any chest pain or

shortness of breath.








Objective





- Vital Signs


Vital signs: 


                               Vital Signs - 12hr











  12/01/20 12/01/20 12/01/20





  21:45 22:00 22:49


 


Temperature  98.0 F 97.7 F


 


Pulse Rate 82 80 83


 


Respiratory  18 16





Rate   


 


Blood Pressure 143/90 144/88 139/92


 


O2 Sat by Pulse   96





Oximetry   














  12/01/20 12/02/20 12/02/20





  22:52 03:23 07:33


 


Temperature  97.9 F 98.3 F


 


Pulse Rate 83 69 79


 


Respiratory  16 20





Rate   


 


Blood Pressure 139/92 127/71 105/74


 


O2 Sat by Pulse  95 92





Oximetry   














  12/02/20 12/02/20 12/02/20





  08:26 08:27 08:49


 


Temperature   


 


Pulse Rate   


 


Respiratory   





Rate   


 


Blood Pressure 120/78 122/75 109/68


 


O2 Sat by Pulse   





Oximetry   














  12/02/20 12/02/20 12/02/20





  08:50 08:52 08:53


 


Temperature   


 


Pulse Rate   


 


Respiratory   





Rate   


 


Blood Pressure 123/67 126/70 121/71


 


O2 Sat by Pulse   





Oximetry   














- General Appearance


General appearance: well-developed, well-nourished, appears stated age


EENT: PERRL, mucous membranes moist


Neck: no JVD, no thyromegaly, no carotid bruit, supple, other (Right IJ PermCath

in place)


Respiratory: Present: Clear to Ascultation


Cardiology: regular, normal heart rate, S1S2, no murmurs


Gastrointestinal: normal, normoactive bowel sounds


Integumentary: no rash, other (No edema)





- Lab





                                 12/01/20 07:23





                                 12/01/20 07:23


                             Most recent lab results











Calcium  9.1 mg/dL (8.4-10.2)   12/01/20  07:23    


 


Phosphorus  5.30 mg/dL (2.5-4.5)  H  11/30/20  10:39    


 


Magnesium  2.50 mg/dL (1.7-2.3)  H  11/30/20  10:39    














Medications & Allergies





- Medications


Allergies/Adverse Reactions: 


                                    Allergies





lisinopril Adverse Reaction (Verified 11/30/20 09:03)


   Unknown








Home Medications: 


                                Home Medications











 Medication  Instructions  Recorded  Confirmed  Last Taken  Type


 


Aspirin BABY CHEW  mg PO ONCE 12/01/20 12/01/20 Unknown History


 


AtorvaSTATin 40 mg PO ONCE 12/01/20 12/01/20 Unknown History


 


NovoLOG Mix 70/30 VIAL 3 units IJ ONCE 12/01/20 12/01/20 Unknown History


 


Velphoro (Nf) 500 mg PO TID 12/01/20 12/01/20 Unknown History











Active Medications: 














Generic Name Dose Route Start Last Admin





  Trade Name Freq  PRN Reason Stop Dose Admin


 


Acetaminophen  650 mg  11/30/20 15:53 





  Tylenol  PO  





  Q4H PRN  





  Pain MILD(1-3)/Fever >100.5/HA  


 


Albuterol  2.5 mg  11/30/20 15:53 





  Proventil  IH  





  Q4HRT PRN  





  Shortness Of Breath  


 


Aspirin  325 mg  12/02/20 10:00 





  Aspirin  PO  





  QDAY [facility]  


 


Atorvastatin Calcium  40 mg  12/01/20 22:00  12/01/20 22:52





  Lipitor  PO   40 mg





  QHS ROSHAN   Administration


 


Heparin Sodium (Porcine)  5,000 unit  11/30/20 22:00  12/01/20 22:52





  Heparin  SUB-Q   5,000 unit





  Q12HR ROSHAN   Administration


 


Sodium Chloride  100 mls @ 999 mls/hr  12/01/20 14:11 





  Nacl 0.9%  IV  





  MIGUEL PRN  





  Hypotension  


 


Insulin Human Regular  0 unit  12/01/20 22:00  12/02/20 08:30





  Humulin R  SUB-Q   Not Given





  ACHS [facility]  





  Protocol  


 


Metoprolol Tartrate  25 mg  12/01/20 22:00  12/01/20 22:52





  Metoprolol  PO   25 mg





  BID ROSHAN   Administration


 


Ondansetron HCl  4 mg  11/30/20 15:53 





  Zofran  IV  





  Q8H PRN  





  Nausea And Vomiting  


 


Sodium Chloride  10 ml  11/30/20 22:00  12/01/20 22:52





  Sodium Chloride Flush Syringe 10 Ml  IV   10 ml





  BID ROSHAN   Administration


 


Sodium Chloride  10 ml  11/30/20 15:53 





  Sodium Chloride Flush Syringe 10 Ml  IV  





  PRN PRN  





  LINE FLUSH

## 2020-12-02 NOTE — DISCHARGE SUMMARY
Providers





- Providers


Date of Admission: 


11/30/20 15:54





Date of discharge: 12/02/20


Attending physician: 


HAYDEN NEAL





                                        





12/01/20 12:40


Consult to Physician [CONS] Routine 


   Comment: 


   Consulting Provider: FANTASMA LEMUS


   Physician Instructions: 


   Reason For Exam: need HD











Primary care physician: 


TESFAYE CASTILLO








Hospitalization


Condition: Fair


Pertinent studies: 





CXR 


VQ scan


Myocardial stress test


Hospital course: 


Brief history:


The pt is a 56 YO male with a past medical history of ESRD on HD, HTN, DM 

presented with c/o left-sided chest pain since Saturday. He describes his chest 

pain as an intermittent, left-sided ribcage aching pain which is aggravated by 

deep inspiration and positional changes (hurts to lie on his left side).  Checks

 x-ray showed mild pulmonary vascular congestion, VQ scan showed low probability

 for PE.  Cardiac enzymes showed elevated troponin, patient placed on aspirin 

and statin, cardiology consulted in the ER, admitted for further evaluation and 

management.





Daily course:


12/01: Plan for stress test tomorrow, nephrology consulted for hemodialysis.  

Continue to monitor clinically


12/02: tte reviewed - EF 50-55%, catheter in RA. S/p lexiscan MPI stress test 

today which showed small mild inferolateral ischemia, normal EF. Given ECG with 

NAF, relatively flat Warner, atypical nature of chest pain and current resolution 

of chest pain, recommend medical management at this time. Cont , lipitor,

 lopressor. Initiate Imdur. Can consider coronary angiography as OP if chest 

pain recurs despite optimal medical management. patient will f/u with Dr. Elam in 2 weeks.  He was then discharged home in stable condition with 

outpatient follow-up.





Discharge diagnosis:


-- Angina at rest


Monitored with serial cardiac enzymes, EKG, telemetry, 


cardiology team consulted in ED, 


Placed on morphine, supplemental oxygen, nitro, aspirin,


VQ scan negative for any PE


Plan for stress test tomorrow








-- Non-ST elevation MI (NSTEMI)


Likely type II due to end-stage renal disease


Cardiology consulted, recommended medical management





--Hyperkalemia, due to end-stage renal disease, corrected with hemodialysis,





-- HTN.


Resumed home antihypertensive and adjusted





-- diabetes mellitus type 2 on insulin


Consistent carb diet, SSI for blood glucose management





ESRD


-Nephrology consulted for hemodialysis








Disposition: DC-01 TO HOME OR SELFCARE


Time spent for discharge: 34 minutes





Core Measure Documentation





- Palliative Care


Palliative Care/ Comfort Measures: Not Applicable





- Core Measures


Any of the following diagnoses?: none





Exam





- Physical Exam


Narrative exam: 





GENERAL:  well-developed and well-nourished -American male lying on bed 

appeared to be in no discomfort. 


HEENT: Normocephalic.  Atraumatic.  No conjunctival congestion or icterus. 

Patient has moist mucous membranes.


NECK: Supple.  Trachea midline.


CHEST/LUNGS: Clear to auscultated bilaterally, breathing nonlabored. No wheezes 

crackles or rhonchi.


HEART/CARDIOVASCULAR: Regular in rate and rhythm.  S1 and S2 positive.


ABDOMEN: Abdomen is soft, nontender.  Patient has normal bowel sounds.  


SKIN: There is no rash.  Warm and dry.


NEURO:  No focal motor deficit.  Follows command.


MUSCULOSKELETAL: No joint effusion or tenderness.


EXTRIMITY: No edema, no cyanosis or clubbing.


PSYCH:  Cooperative.





- Constitutional


Vitals: 


                                        











Temp Pulse Resp BP Pulse Ox


 


 98.3 F   79   20   121/71   92 


 


 12/02/20 07:33  12/02/20 07:33  12/02/20 07:33  12/02/20 08:53  12/02/20 07:33














Plan


Activity: advance as tolerated


Weight Bearing Status: Non-Weight Bearing


Diet: renal


Special Instructions: restrict fluid intake to (1.5L daily), record daily 

weights, record daily BP diary


Follow up with: 


TESFAYE CASTILLO MD [Primary Care Provider] - 3-5 Days


ROMA ELAM MD [Staff Physician] - 7 Days


Prescriptions: 


AtorvaSTATin [Lipitor] 40 mg PO QHS #30 tablet


Aspirin 325 mg PO QDAY #30 tablet


ISOSORBIDE MONOnitrate [Imdur ER] 30 mg PO QDAY #30 tablet


Metoprolol [Lopressor TAB] 25 mg PO BID #60 tablet

## 2021-05-28 ENCOUNTER — HOSPITAL ENCOUNTER (OUTPATIENT)
Dept: HOSPITAL 5 - CATHLABREC | Age: 56
Discharge: HOME | End: 2021-05-28
Attending: INTERNAL MEDICINE
Payer: MEDICARE

## 2021-05-28 VITALS — SYSTOLIC BLOOD PRESSURE: 180 MMHG | DIASTOLIC BLOOD PRESSURE: 70 MMHG

## 2021-05-28 DIAGNOSIS — Z88.8: ICD-10-CM

## 2021-05-28 DIAGNOSIS — E11.22: ICD-10-CM

## 2021-05-28 DIAGNOSIS — E78.5: ICD-10-CM

## 2021-05-28 DIAGNOSIS — Z83.3: ICD-10-CM

## 2021-05-28 DIAGNOSIS — Z79.899: ICD-10-CM

## 2021-05-28 DIAGNOSIS — I13.2: ICD-10-CM

## 2021-05-28 DIAGNOSIS — R94.39: Primary | ICD-10-CM

## 2021-05-28 DIAGNOSIS — N18.6: ICD-10-CM

## 2021-05-28 DIAGNOSIS — Z79.82: ICD-10-CM

## 2021-05-28 DIAGNOSIS — Z80.8: ICD-10-CM

## 2021-05-28 DIAGNOSIS — Z99.2: ICD-10-CM

## 2021-05-28 DIAGNOSIS — Z98.890: ICD-10-CM

## 2021-05-28 DIAGNOSIS — I50.9: ICD-10-CM

## 2021-05-28 DIAGNOSIS — I25.118: ICD-10-CM

## 2021-05-28 LAB
APTT BLD: 28.2 SEC. (ref 24.2–36.6)
BASOPHILS # (AUTO): 0.1 K/MM3 (ref 0–0.1)
BASOPHILS NFR BLD AUTO: 0.9 % (ref 0–1.8)
BUN SERPL-MCNC: 43 MG/DL (ref 9–20)
BUN/CREAT SERPL: 4 %
CALCIUM SERPL-MCNC: 8.4 MG/DL (ref 8.4–10.2)
EOSINOPHIL # BLD AUTO: 0.3 K/MM3 (ref 0–0.4)
EOSINOPHIL NFR BLD AUTO: 4.4 % (ref 0–4.3)
HCT VFR BLD CALC: 36.8 % (ref 35.5–45.6)
HEMOLYSIS INDEX: 10
HGB BLD-MCNC: 12.3 GM/DL (ref 11.8–15.2)
INR PPP: 1.02 (ref 0.87–1.13)
LYMPHOCYTES # BLD AUTO: 1.6 K/MM3 (ref 1.2–5.4)
LYMPHOCYTES NFR BLD AUTO: 27.9 % (ref 13.4–35)
MCHC RBC AUTO-ENTMCNC: 34 % (ref 32–34)
MCV RBC AUTO: 93 FL (ref 84–94)
MONOCYTES # (AUTO): 0.6 K/MM3 (ref 0–0.8)
MONOCYTES % (AUTO): 9.8 % (ref 0–7.3)
PLATELET # BLD: 121 K/MM3 (ref 140–440)
RBC # BLD AUTO: 3.95 M/MM3 (ref 3.65–5.03)

## 2021-05-28 PROCEDURE — 85730 THROMBOPLASTIN TIME PARTIAL: CPT

## 2021-05-28 PROCEDURE — 80048 BASIC METABOLIC PNL TOTAL CA: CPT

## 2021-05-28 PROCEDURE — 36415 COLL VENOUS BLD VENIPUNCTURE: CPT

## 2021-05-28 PROCEDURE — 93005 ELECTROCARDIOGRAM TRACING: CPT

## 2021-05-28 PROCEDURE — 93458 L HRT ARTERY/VENTRICLE ANGIO: CPT

## 2021-05-28 PROCEDURE — C1894 INTRO/SHEATH, NON-LASER: HCPCS

## 2021-05-28 PROCEDURE — 99156 MOD SED OTH PHYS/QHP 5/>YRS: CPT

## 2021-05-28 PROCEDURE — 85610 PROTHROMBIN TIME: CPT

## 2021-05-28 PROCEDURE — 85025 COMPLETE CBC W/AUTO DIFF WBC: CPT

## 2021-05-28 NOTE — SHORT STAY SUMMARY
Short Stay Documentation


Date of service: 05/28/21





- History


H&P: obtained from office


Past Medical History: diabetes, dialysis, ESRD, hypertension, hyperlipidemia


Past Surgical History: no valve replacement, no CABG, no PTCA


Social history: no smoking, no alcohol abuse





- Allergies and Medications


Current Medications: 


                                    Allergies





lactose Adverse Reaction (Verified 05/28/21 07:20)


   Diarrhea


lisinopril Adverse Reaction (Verified 05/28/21 07:20)


   Unknown


   cough 





                                Home Medications











 Medication  Instructions  Recorded  Confirmed  Last Taken  Type


 


NovoLOG Mix 70/30 VIAL 2 units IJ ONCE 12/01/20 05/28/21 05/27/21 History





    2 units 


 


Velphoro (Nf) 500 mg PO TID 12/01/20 05/28/21 05/27/21 History





    3 tabs 


 


AtorvaSTATin [Lipitor] 40 mg PO QHS #30 tablet 12/02/20 05/28/21 05/27/21 Rx





    1 tab 


 


ISOSORBIDE MONOnitrate [Imdur ER] 30 mg PO QDAY #30 tablet 12/02/20 05/28/21 05/27/21 Rx





    1 tab 


 


Aspirin 81 mg PO QDAY 05/28/21 05/28/21 05/27/21 History





    1 tab 


 


Metoprolol [Lopressor TAB] 50 mg PO BID 05/28/21 05/28/21 05/27/21 History





    2 tab 








Active Medications





Sodium Chloride (Nacl 0.9% 500 Ml)  500 mls @ 50 mls/hr IV AS DIRECT ROSHAN


   Stop: 05/28/21 16:59


   Last Admin: 05/28/21 08:11 Dose:  50 mls/hr


   Documented by: 











- Physical exam


General appearance: no acute distress


Integumentary: no rash, other (R radial site - c/d/i, no evidence of bleeding or

hematoma)


HEENT: Atraumatic


Lungs: Clear to auscultation


Heart: Normal S1, Normal S2, No murmurs


Gastrointestinal: normal


Extremities: pulses symmetrical, No edema, normal temperature


Neurological: Normal speech, Normal tone, Sensation intact





- Brief post op/procedure progress note


Date of procedure: 05/28/21


Pre-op diagnosis: Abnormal Stress Test


Post-op diagnosis: other (CAD)


Surgeon: ROMA MELENDEZ


Estimated blood loss: minimal


Pathology: none


Condition: stable





- Hospital course


Hospital course: 


Pt presented on 5/28/2021 for elective LHC in the setting of abnormal stress 

test. LHC performed today revealed severe multi-vessel CAD (see cath report). Pt

tolerate procedure well. Currently stable with no complaints. Pt will be 

referred for CABG to be performed as an outpatient.





- Disposition


Condition at discharge: Good


Disposition: DC-01 TO HOME OR SELFCARE





- Discharge Diagnoses


(1) CAD (coronary artery disease)


Status: Chronic   


Qualifiers: 


   Coronary Disease-Associated Artery/Lesion type: native artery   Native vs. 

transplanted heart: native heart 





Short Stay Discharge Plan


Activity: advance as tolerated


Diet: low fat, low cholesterol, low salt, diabetic


Wound: per your surgeon's advice


Additional Instructions: 


You should receive a call from Cardiac Surgeons office next week to schedule an 

appt.


Follow up with: 


TESFAYE CASTILLO MD [Primary Care Provider] - 7 Days


ROMA MELENDEZ MD [Staff Physician] - 7 Days


Forms:  CardCath PCI D/C Instructions

## 2021-05-28 NOTE — ELECTROCARDIOGRAPH REPORT
[facility]

                                       

Test Date:    2021               Test Time:    07:29:43

Pat Name:     STEPHANIE LESLIE JR        Department:   

Patient ID:   [id number]          Room:          

Gender:       M                        Technician:   LINDA

:          1965               Requested By: ROMA MELENDEZ

Order Number: [id number]              Reading MD:   Tripp Harris

                                 Measurements

Intervals                              Axis          

Rate:         66                       P:            37

IA:           194                      QRS:          -24

QRSD:         99                       T:            49

QT:           403                                    

QTc:          424                                    

                           Interpretive Statements

Sinus rhythm

No previous ECG available for comparison

Electronically Signed On 2021 13:39:01 EDT by Tripp Harris

## 2021-05-28 NOTE — CARDIAC CATHERIZATION REPORT
DATE OF SERVICE: 05/28/2021



LEFT HEART CATHETERIZATION



CLINICAL INFORMATION:  This is a 56-year-old -American gentleman with 

end-stage renal disease on hemodialysis, hypertension, hyperlipidemia, had 

abnormal stress test, here for a left heart catheterization.  Left heart 

catheterization performed by the right radial artery, sterile technique, local 

anesthesia.  A 6-[place] radial sheath inserted.



Procedure was done under moderate sedation, was started at 8:48 and finished at 

8:58.  10 minutes of moderate sedation.



Procedure was done via the right radial artery, sterile technique, local 

anesthesia.  A 6-[place] radial sheath inserted.



 engaged JL 3.5 catheter.  Left main is a large caliber vessel, proximal patent,
mid

20-30%, bifurcates into a medium to large caliber LAD.  Mid has 80%, at 

bifurcation 80% ostial diagonal 1.  Both are medium caliber vessels.  Circumflex

ostial large 95% ostial. Circumflex is a large caliber vessel.  OM1 is a medium 

caliber vessel, patent.  After OM1, there is a 95% lesion in the mid circumflex.

 OM2 patent.  RCA, proximal mid diffuse 70%-80%, PDA 70%.  Extensive right to 

left collaterals feeding into the LAD and into the OM system.  LV gram done in 

[place] and [place] shows EF 55%-60%.  15 mmHg.  LV is 118, aortic is 116/67.  No 

gradient across the aortic valve on pullback.  A 5-[place] catheter taken out 
over

guidewire.  A 6-[place] radial sheath discontinued.  Radial band applied.  No 

hematoma.  No bleeding.



SUMMARY:

1.  Multivessel disease.  Left main distal 20%, LAD mid bifurcation 80%, ostial 

diagonal 1 80%, circumflex ostial 94%, mid 95%, OM1 and OM2 patent, RCA proximal

and mid 70%, PDA 70%, right to left collaterals.

2.  Normal left ventricular function.

3.  The patient will be referred for bypass surgery at [place].  Discussed in 

detail with the patient's family.







DD: 05/28/2021 09:19 AM

DT: 05/28/2021 09:28 AM

TID: [id number] RECEIPT: [id number]

ROSA/RANDA/SHERYL LLAMAS